# Patient Record
Sex: FEMALE | URBAN - METROPOLITAN AREA
[De-identification: names, ages, dates, MRNs, and addresses within clinical notes are randomized per-mention and may not be internally consistent; named-entity substitution may affect disease eponyms.]

---

## 2024-01-19 ENCOUNTER — OFFICE VISIT (OUTPATIENT)
Dept: URBAN - METROPOLITAN AREA CLINIC 115 | Facility: CLINIC | Age: 64
End: 2024-01-19
Payer: COMMERCIAL

## 2024-01-19 ENCOUNTER — LAB OUTSIDE AN ENCOUNTER (OUTPATIENT)
Dept: URBAN - METROPOLITAN AREA CLINIC 115 | Facility: CLINIC | Age: 64
End: 2024-01-19

## 2024-01-19 VITALS
WEIGHT: 122.6 LBS | TEMPERATURE: 98 F | BODY MASS INDEX: 22.56 KG/M2 | HEART RATE: 80 BPM | HEIGHT: 62 IN | DIASTOLIC BLOOD PRESSURE: 82 MMHG | RESPIRATION RATE: 15 BRPM | SYSTOLIC BLOOD PRESSURE: 131 MMHG

## 2024-01-19 DIAGNOSIS — R15.2 FECAL URGENCY: ICD-10-CM

## 2024-01-19 DIAGNOSIS — R19.4 ALTERED BOWEL HABITS: ICD-10-CM

## 2024-01-19 DIAGNOSIS — R15.9 FULL INCONTINENCE OF FECES: ICD-10-CM

## 2024-01-19 PROCEDURE — 99204 OFFICE O/P NEW MOD 45 MIN: CPT

## 2024-01-19 RX ORDER — MULTIVITAMIN
1 TABLET TABLET ORAL ONCE A DAY
Status: ACTIVE | COMMUNITY

## 2024-01-19 RX ORDER — LOSARTAN POTASSIUM 50 MG/1
1 TABLET TABLET, FILM COATED ORAL ONCE A DAY
Status: ACTIVE | COMMUNITY

## 2024-01-19 RX ORDER — LOSARTAN POTASSIUM 25 MG/1
1 TABLET TABLET, FILM COATED ORAL ONCE A DAY
Status: DISCONTINUED | COMMUNITY

## 2024-01-19 RX ORDER — HYDROCHLOROTHIAZIDE 12.5 MG/1
1 CAPSULE IN THE MORNING CAPSULE, GELATIN COATED ORAL ONCE A DAY
Status: ACTIVE | COMMUNITY

## 2024-01-19 NOTE — HPI-TODAY'S VISIT:
62 y/o F with PMH of HTN presents with c/o change in bowel habits starting >2 wks ago. Reports loose stools (usually in AM, then again once in the PM), one time of fecal incontinence, fecal urgency, then getting constipated at the end of the week (difficult to evacuate and it gets hard). States she normally goes once a day, usually soft and formed. Additionally reports low abdominal discomfort, rectal pain. States her weight has started to fluctuate after her loose stools but would regain it and keep going back and forth, but notes she wasn't eating as much at the beginning b/c she was busy. Denies dietary changes. Started taking debora probiotic and supplements suggested by her nutritionist (Gabatrol stress and mood, gymnema , collinsonia root, sesame seed oil, DHEA, and vitamin D3). Denies n/v, dysphagia, odynophagia, heartburn, blood in stool, unintentional weight loss, change in appetite, early satiety.  Denies FHx of GI Yeison, IBD. Denies NSAID use. Reports EtOH (once a month). Denies tobacco/marijuana use. Previous abdominal surgeries include: none; Denies pacemaker, defibrillator, FRANCISCO JAVIER, chest pain, shortness of breath, kidney issues, blood thinners. Last colonoscopy: in 2018 was normal; next due 10 yrs; uncertain history but pt states she never had polyps removed

## 2024-01-27 LAB
A/G RATIO: 1.8
ALBUMIN: 4.3
ALKALINE PHOSPHATASE: 58
ALT (SGPT): 17
AST (SGOT): 20
BILIRUBIN, TOTAL: 0.6
BUN/CREATININE RATIO: (no result)
BUN: 14
CALCIUM: 8.9
CARBON DIOXIDE, TOTAL: 30
CHLORIDE: 105
CREATININE: 1.04
EGFR: 60
GLOBULIN, TOTAL: 2.4
GLUCOSE: 90
POTASSIUM: 4.2
PROTEIN, TOTAL: 6.7
SODIUM: 142
TSH: 1.31

## 2024-02-27 ENCOUNTER — COLON (OUTPATIENT)
Dept: URBAN - METROPOLITAN AREA SURGERY CENTER 13 | Facility: SURGERY CENTER | Age: 64
End: 2024-02-27

## 2024-02-27 RX ORDER — LOSARTAN POTASSIUM 50 MG/1
1 TABLET TABLET, FILM COATED ORAL ONCE A DAY
Status: ACTIVE | COMMUNITY

## 2024-02-27 RX ORDER — HYDROCHLOROTHIAZIDE 12.5 MG/1
1 CAPSULE IN THE MORNING CAPSULE, GELATIN COATED ORAL ONCE A DAY
Status: ACTIVE | COMMUNITY

## 2024-02-27 RX ORDER — MULTIVITAMIN
1 TABLET TABLET ORAL ONCE A DAY
Status: ACTIVE | COMMUNITY

## 2024-03-15 ENCOUNTER — OV NP (OUTPATIENT)
Dept: URBAN - METROPOLITAN AREA CLINIC 115 | Facility: CLINIC | Age: 64
End: 2024-03-15

## 2024-03-25 ENCOUNTER — OV EP (OUTPATIENT)
Dept: URBAN - METROPOLITAN AREA CLINIC 115 | Facility: CLINIC | Age: 64
End: 2024-03-25
Payer: COMMERCIAL

## 2024-03-25 VITALS
HEIGHT: 62 IN | BODY MASS INDEX: 22.97 KG/M2 | TEMPERATURE: 98 F | SYSTOLIC BLOOD PRESSURE: 146 MMHG | HEART RATE: 83 BPM | WEIGHT: 124.8 LBS | DIASTOLIC BLOOD PRESSURE: 87 MMHG

## 2024-03-25 DIAGNOSIS — R19.4 CHANGE IN BOWEL HABITS: ICD-10-CM

## 2024-03-25 DIAGNOSIS — Z98.890 STATUS POST COLONOSCOPY: ICD-10-CM

## 2024-03-25 PROCEDURE — 99212 OFFICE O/P EST SF 10 MIN: CPT

## 2024-03-25 RX ORDER — HYDROCHLOROTHIAZIDE 12.5 MG/1
1 CAPSULE IN THE MORNING CAPSULE, GELATIN COATED ORAL ONCE A DAY
Status: ACTIVE | COMMUNITY

## 2024-03-25 RX ORDER — MULTIVITAMIN
1 TABLET TABLET ORAL ONCE A DAY
Status: ACTIVE | COMMUNITY

## 2024-03-25 RX ORDER — LOSARTAN POTASSIUM 50 MG/1
1 TABLET TABLET, FILM COATED ORAL ONCE A DAY
Status: ACTIVE | COMMUNITY

## 2024-03-25 NOTE — HPI-TODAY'S VISIT:
1/19/24: 62 y/o F with PMH of HTN presents with c/o change in bowel habits starting >2 wks ago. Reports loose stools (usually in AM, then again once in the PM), one time of fecal incontinence, fecal urgency, then getting constipated at the end of the week (difficult to evacuate and it gets hard). States she normally goes once a day, usually soft and formed. Additionally reports low abdominal discomfort, rectal pain. States her weight has started to fluctuate after her loose stools but would regain it and keep going back and forth, but notes she wasn't eating as much at the beginning b/c she was busy. Denies dietary changes. Started taking debora probiotic and supplements suggested by her nutritionist (Gabatrol stress and mood, gymnema , collinsonia root, sesame seed oil, DHEA, and vitamin D3). Denies n/v, dysphagia, odynophagia, heartburn, blood in stool, unintentional weight loss, change in appetite, early satiety.  Denies FHx of GI Yeison, IBD. Denies NSAID use. Reports EtOH (once a month). Denies tobacco/marijuana use. Previous abdominal surgeries include: none; Denies pacemaker, defibrillator, FRANCISCO JAVIER, chest pain, shortness of breath, kidney issues, blood thinners. Last colonoscopy: in 2018 was normal; next due 10 yrs; uncertain history but pt states she never had polyps removed 3/25/24 today visit: Pt presents for follow up. COL with Dr. Jose showed ascending colon diverticula, int hemorrhoids, random bx's neg; repeat 10Y. TSH and CMP normal. States she stopped Miralax/Metamucil because she had travels but had resumed normal bowel habits (now going once in the AM, full evacuation, soft and formed) Denies abdominal pain, rectal pain, weight has stopped fluctuating. States she has been increasing fiber in her diet.